# Patient Record
Sex: MALE | Race: AMERICAN INDIAN OR ALASKA NATIVE
[De-identification: names, ages, dates, MRNs, and addresses within clinical notes are randomized per-mention and may not be internally consistent; named-entity substitution may affect disease eponyms.]

---

## 2020-01-01 ENCOUNTER — HOSPITAL ENCOUNTER (EMERGENCY)
Dept: HOSPITAL 5 - ED | Age: 25
Discharge: LEFT BEFORE DISCHARGE | End: 2020-01-01
Payer: SELF-PAY

## 2020-01-01 VITALS — DIASTOLIC BLOOD PRESSURE: 85 MMHG | SYSTOLIC BLOOD PRESSURE: 172 MMHG

## 2020-01-01 DIAGNOSIS — J02.9: Primary | ICD-10-CM

## 2020-01-01 NOTE — EMERGENCY DEPARTMENT REPORT
Chief Complaint: Sore Throat


Stated Complaint: GENERAL ILLNESS


Time Seen by Provider: 01/01/20 12:27





- HPI


History of Present Illness: 


24 y o healthy male presents with cc of throat pain x 1 week


Pt denies fever,chills, dysphagia, or








- ROS


Review of Systems: 





as noted in HPI





- Exam


Vital Signs: 


                                   Vital Signs











  01/01/20





  12:24


 


Temperature 98.5 F


 


Pulse Rate 85


 


Respiratory 16





Rate 


 


Blood Pressure 172/85


 


O2 Sat by Pulse 99





Oximetry 











Physical Exam: 


ENT: normal, no tonsil edema or exudate, no air way compromise





MSE screening note: 


Focused history and physical exam performed.


Due to findings the following was ordered:











ED Medical Decision Making





- Medical Decision Making





Pt presents with a non-medical emergency


His exam is normal, VSS stable


Pt given information for clinics to follow up with for further treatment and 

evaluation


Also discussed strict return precautions in detail with pt who verbalized 

understanding 





ED Disposition for MSE


Clinical Impression: 


 Sore throat





Disposition: Z-07 MED SCREENING EXAM-LEFT


Is pt being admited?: No


Does the pt Need Aspirin: No


Condition: Stable


Instructions:  Pharyngitis (ED)


Additional Instructions: 


take motrin as needed for pain


throat pain will resolve


follow up with clinic as reffered





Referrals: 


[facility] [Primary Care Provider] - 3-5 Days


[facility] [Outside] - 3-5 Days


[facility] [Outside] - 3-5 Days


Forms:  Work/School Release Form(ED)


Time of Disposition: 13:01